# Patient Record
(demographics unavailable — no encounter records)

---

## 2024-11-07 NOTE — REASON FOR VISIT
[Abnormal Uterine Bleeding] : abnormal uterine bleeding [FreeTextEntry2] : Abnormal uterine bleeding

## 2024-11-07 NOTE — HISTORY OF PRESENT ILLNESS
[Patient reported PAP Smear was normal] : Patient reported PAP Smear was normal [perimenopausal] : perimenopausal [N] : Patient is not sexually active [Y] : Positive pregnancy history [Prior Menses:  ___ (Date)] : date of prior menstruation was [unfilled] [Experiencing Menopausal Sxs] : experiencing menopausal symptoms [Menopause Age: ____] : age at menopause was [unfilled] [No] : Patient does not have concerns regarding sex [Previously active] : previously active [TextBox_4] : 57-year old  3 para 2 LMP 2024,  2024 presents c/o abnormal vaginal bleeding x 2 days now.  The patient states that prior to January her cycles came monthly.   [PapSmeardate] : 2019 [TextBox_102] : Irregular since January. [LMPDate] : 4/1/2024 [PGHxTotal] : 3 [[facility]] : 2 [[facility]] : 2 [PGxEctopic] : 1 [FreeTextEntry1] : 4/1/2024

## 2024-11-07 NOTE — PROCEDURE
[Fibroid Uterus] : fibroid uterus [Abnormal Uterine Bleeding] : abnormal uterine bleeding [Transvaginal Ultrasound] : transvaginal ultrasound [Anteverted] : anteverted [L: ___ cm] : L: [unfilled] cm [W: ___cm] : W: [unfilled] cm [H: ___ cm] : H: [unfilled] cm [de-identified] : transabdominal ultrasound [FreeTextEntry7] : not visualized [FreeTextEntry8] : 3.98 x 4.07 x 3.52 cm [FreeTextEntry4] :  As per Dr. Solomon, this ultrasound was performed. The uterus is heterogeneous with multiple fibroids and anteverted. The endometrium was not visualized due to obstruction from fibroids. The cervix measures 4.34 cm. The right ovary was not visualized. The left ovary has a simple cyst measuring: 3.58 x 3.44 x 3.21 cm Multiple fibroids were visualized, see below for locations and sizes: Midline Intramural vs Submucosal: 10.71 x 10.47 x 9.31 cm  Posterior Subserosal: 4.86 x 5.31 x 4.88 cm Anterior Subserosal (Partially Calcified): 3.20 x 3.40 x 1.74 cm Left Lateral Intramural: 5.68 x 5.49 x 5.36 cm

## 2024-11-07 NOTE — PLAN
[FreeTextEntry1] : Wellness exam.  Physical examination is notable for an enlarged leiomyomatous uterus.  Will obtain pelvic ultrasound and abdominal pelvic MRI.  Recommendations: Mammography, screening colonoscopy, dental, and dermatological examination.   Discussed proper nutrition and physical exercise. Reviewed age-appropriate vaccinations.

## 2024-11-07 NOTE — PROCEDURE
[Fibroid Uterus] : fibroid uterus [Abnormal Uterine Bleeding] : abnormal uterine bleeding [Transvaginal Ultrasound] : transvaginal ultrasound [Anteverted] : anteverted [L: ___ cm] : L: [unfilled] cm [W: ___cm] : W: [unfilled] cm [H: ___ cm] : H: [unfilled] cm [de-identified] : transabdominal ultrasound [FreeTextEntry7] : not visualized [FreeTextEntry8] : 3.98 x 4.07 x 3.52 cm [FreeTextEntry4] :  As per Dr. Solomon, this ultrasound was performed. The uterus is heterogeneous with multiple fibroids and anteverted. The endometrium was not visualized due to obstruction from fibroids. The cervix measures 4.34 cm. The right ovary was not visualized. The left ovary has a simple cyst measuring: 3.58 x 3.44 x 3.21 cm Multiple fibroids were visualized, see below for locations and sizes: Midline Intramural vs Submucosal: 10.71 x 10.47 x 9.31 cm  Posterior Subserosal: 4.86 x 5.31 x 4.88 cm Anterior Subserosal (Partially Calcified): 3.20 x 3.40 x 1.74 cm Left Lateral Intramural: 5.68 x 5.49 x 5.36 cm

## 2024-11-07 NOTE — PHYSICAL EXAM
[Chaperone Present] : A chaperone was present in the examining room during all aspects of the physical examination [Oriented x3] : oriented x3 [Examination Of The Breasts] : a normal appearance [No Masses] : no breast masses were palpable [Labia Majora] : normal [Labia Minora] : normal [Uterine Adnexae] : normal [Enlarged ___ wks] : enlarged [unfilled] ~Uweeks [FreeTextEntry2] :  Appropriately responsive, alert, and no acute distress. [FreeTextEntry3] :  Thyroid is non-enlarged, nontender. No palpable nodules or goiter. No lymphadenopathy. [FreeTextEntry7] :  Soft. Nondistended. Nontender. No rebound or guarding. There are no palpable masses. [Vulvar Atrophy] : vulvar atrophy [Normal] : normal [FreeTextEntry1] :  External genitalia are within normal limits with no lesions visualized. [FreeTextEntry4] : Small amount of blood in the vault.  No vaginal discharge or lesions noted within the vaginal vault. [FreeTextEntry5] :  A speculum was inserted without any difficulty. The cervix is posterior. The cervix was normal in appearance. No cervical motion tenderness. Pap smear obtained.  [FreeTextEntry6] : Bimanual examination was notable and enlarged, approximately 24 weeks size fibroid uterus.  The adnexa were not well palpated. [FreeTextEntry9] :  No lesions. No palpable masses.

## 2025-01-31 NOTE — PLAN
[FreeTextEntry1] : 58 -year old  3 para 2 LMP 2024, presents for a postoperative evaluation. She is status post D & C and hysteroscopy on 2025. Indication was for fibroid uterus and abnormal bleeding.

## 2025-01-31 NOTE — REVIEW OF SYSTEMS
[FreeTextEntry8] : abnormal uterine bleeding
never

## 2025-01-31 NOTE — HISTORY OF PRESENT ILLNESS
[Patient reported PAP Smear was normal] : Patient reported PAP Smear was normal [LMP unknown] : LMP unknown [perimenopausal] : perimenopausal [Y] : Positive pregnancy history [unknown] : Patient is unsure of the date of her LMP [Currently In Menopause] : currently in menopause [Menopause Age: ____] : age at menopause was [unfilled] [Currently Active] : currently active [Men] : men [TextBox_4] : 58 -year old  3 para 2 LMP 2024, presents for a postoperative evaluation. She is status post D & C and hysteroscopy on 2025. Indication was for fibroid uterus and abnormal bleeding.  [PapSmeardate] : 11/07/2024 [PGHxTotal] : 3 [[facility]] : 2 [[facility]] : 2 [PGxEctopic] : 1

## 2025-01-31 NOTE — HISTORY OF PRESENT ILLNESS
[Patient reported PAP Smear was normal] : Patient reported PAP Smear was normal [LMP unknown] : LMP unknown [perimenopausal] : perimenopausal [Y] : Positive pregnancy history [unknown] : Patient is unsure of the date of her LMP [Currently In Menopause] : currently in menopause [Menopause Age: ____] : age at menopause was [unfilled] [Currently Active] : currently active [Men] : men [TextBox_4] : 58 -year old  3 para 2 LMP 2024, presents for a postoperative evaluation. She is status post D & C and hysteroscopy on 2025. Indication was for fibroid uterus and abnormal bleeding.  [PapSmeardate] : 11/07/2024 [PGHxTotal] : 3 [[facility] 2 [[facility]] : 2 [PGxEctopic] : 1

## 2025-01-31 NOTE — HISTORY OF PRESENT ILLNESS
[Patient reported PAP Smear was normal] : Patient reported PAP Smear was normal [LMP unknown] : LMP unknown [perimenopausal] : perimenopausal [Y] : Positive pregnancy history [unknown] : Patient is unsure of the date of her LMP [Currently In Menopause] : currently in menopause [Menopause Age: ____] : age at menopause was [unfilled] [Currently Active] : currently active [Men] : men [TextBox_4] : 58 -year old  3 para 2 LMP 2024, presents for a postoperative evaluation. She is status post D & C and hysteroscopy on 2025. Indication was for fibroid uterus and abnormal bleeding.  [PapSmeardate] : 11/07/2024 [PGHxTotal] : 3 [[facility]] : 2 [[facility]] : 2 [PGxEctopic] : 1

## 2025-01-31 NOTE — PHYSICAL EXAM
[Uterine Adnexae] : normal [Soft] : soft [Non-tender] : non-tender [Non-distended] : non-distended [Vulvar Atrophy] : vulvar atrophy [Labia Majora] : normal [Labia Minora] : normal [Normal] : normal [Appropriately responsive] : appropriately responsive [Alert] : alert [No Acute Distress] : no acute distress [Atrophy] : atrophy [FreeTextEntry2] :  Appropriately responsive, alert, and no acute distress. [FreeTextEntry7] :  Soft. Nondistended. Nontender. No rebound or guarding. There are no palpable masses. Back: No CVA tenderness. [FreeTextEntry1] :  External genitalia are within normal limits with no lesions visualized. [FreeTextEntry4] :  No vaginal discharge, blood or any lesions noted within the vaginal vault. [FreeTextEntry5] :  A speculum was inserted without any difficulty. The cervix was normal in appearance. No cervical motion tenderness. [FreeTextEntry6] : Bimanual examination was notable for a normal, nontender uterus. There were no palpable adnexal masses or adnexal tenderness.

## 2025-02-10 NOTE — HISTORY OF PRESENT ILLNESS
[LMP unknown] : LMP unknown [postmenopausal] : postmenopausal [Y] : Positive pregnancy history [unknown] : Patient is unsure of the date of her LMP [Menopause Age: ____] : age at menopause was [unfilled] [TextBox_4] : 58 -year old  3 para 1 postmenopausal presents for a surgical consultation. She is here today to discuss hysterectomy procedure. [PapSmeardate] : 11/07/2024 [PGHxTotal] : 3 [[facility]] : 2 [[facility]] : 2 [PGxEctopic] : 1

## 2025-02-10 NOTE — HISTORY OF PRESENT ILLNESS
[LMP unknown] : LMP unknown [postmenopausal] : postmenopausal [Y] : Positive pregnancy history [unknown] : Patient is unsure of the date of her LMP [Menopause Age: ____] : age at menopause was [unfilled] [TextBox_4] : 58 -year old  3 para 1 postmenopausal presents for a surgical consultation. She is here today to discuss hysterectomy procedure. [PapSmeardate] : 11/07/2024 [PGHxTotal] : 3 [[facility]] : 2 [[facility]] : 2 [PGxEctopic] : 1

## 2025-02-10 NOTE — PLAN
[FreeTextEntry1] : 58 -year old  3 para 1 postmenopausal presents for a surgical consultation. She is here today to discuss hysterectomy procedure.  She is status post fractional dilatation and curettage and diagnostic hysteroscopy on 2025. Indications for the procedure were an enlarged leiomyomatous uterus and abnormal uterine bleeding

## 2025-02-20 NOTE — HISTORY OF PRESENT ILLNESS
[FreeTextEntry1] : Ms. Ruiz is a 59 yo  who presents today as a referral from Dr. Solomon for surgical consultation.  Pt with h/o AUB, shakira-menopausal, experiencing menopausal symptoms ~57 (LMP ~24). Pt underwent suboptimal D&C hysteroscopy (25),  procedure limited by large uterine cavity size and multiple fibroids. Final pathology demonstrated fragments of endometrial polyp, as well as disordered proliferative endometrium w/ tubal metaplasia; negative for hyperplasia.  Given large size of uterus and bulk-symptoms, pt was recommended to undergo definitive surgical management with hysterectomy. Gyn Oncology consulted for surgical assistance given size of uterus.  Today, pt reports feeling relatively well. Reports no further episodes of bleeding since November. Tolerating PO w/o issue. Endorses regular bowel movements.  Of note, extremely elevated BP today. Denies HA/vis changes/CP/SOB. Pt reports no diagnosis of HTN. Strongly encouraged pt to go to hospital given hypertensive emergency and stroke risk.  Pelvic MRI (24) Uterus: Enlarged and anteverted, measuring 25.7 x 13.7 x 23cm; extends 14cm above the umbilicus to L2; Dominant right-sided intramural heterogeneous fibroid showing heterogenous appearance and heterogeneous enhancement and measures 12 x 13cm; Sarcomatous changes cannot be excluded. Near the fundus is an 8 x 6cm enhancing fibroid In the lower uterine segment posteriorly is a 6 x7 cm fibroid Along the left side are two fibroids measuring around 4cm in size. Several other smaller fibroids are seen Endometrium: Distorted due to multiple fibroids; There is a 6mm endometrium and an 8mm junctional zone Adnexa:  Right ovary: not visualized Left ovary: enlarged measuring 5.3 x 3.2 x 4cm w/ dominant 4cm cyst and an additional 25mm dominant follicle  HCM   Pap: 24, NILM, HPV neg Mammo: , wnl per pt C-scope: Never   Ob Hx:  Gyn Hx: Last menses 2024, has not yet gone a whole year w/o menses; +fibroids Med Hx: Obesity (BMI 36) Surg Hx: D&C Meds: None All: Meloxicam Fam Hx: None Soc Hx: Denies tobacco/EtOH/illicit drug use

## 2025-02-20 NOTE — CHIEF COMPLAINT
[FreeTextEntry1] : [place] [employer] Location  [facility] [place] [employer] [phone] [address] [place] [address]  CC: Surgical consultation

## 2025-02-20 NOTE — ASSESSMENT
[FreeTextEntry1] : Ms. Ruiz is a 59 yo  who presents today as a referral from Dr. Solomon for surgical consultation. Massively enlarged fibroid uterus. Reviewed surgical approach, r/b/a. Reviewed high risk of bleeding given enlarged fibroid uterus. Consent signed for ex-lap/GIRMA/BSO/cystoscopy/all other indicated procedures. Overall low risk of malignancy however given age and PMB, will send for IOFS. Staging to be performed if pt has cancer. Of note, extremely elevated BP today. Denies HA/vis changes/CP/SOB. Pt reports no diagnosis of HTN. Strongly encouraged pt to go to hospital given hypertensive emergency and stroke risk. Pt will need PCP clearance in addition to PST prior to surgery.

## 2025-02-20 NOTE — CHIEF COMPLAINT
[FreeTextEntry1] : [place] Location  [facility] [place] [phone] [address] [place] [address]  CC: Surgical consultation

## 2025-02-20 NOTE — PHYSICAL EXAM
[Chaperone Present] : A chaperone was present in the examining room during all aspects of the physical examination [78648] : A chaperone was present during the pelvic exam. [Normal] : No focal neurologic defects observed [Fully active, able to carry on all pre-disease performance without restriction] : Status 0 - Fully active, able to carry on all pre-disease performance without restriction [FreeTextEntry2] : Deborah Carlisle MA [FreeTextEntry1] : older than stated age [de-identified] : uterus palpable to approx 4 finger breadths above umbilicus, not quite at xyphoid; no TTP [de-identified] : SSE: NEFG, normal vaginal mucosa, grossly normal appearing cervix w/o lesions/nodularity BME: Massively enlarged uterus, abnormal contours measuring approx 4 finger breadths beyond umbilicus, unable to palpate adnexa

## 2025-02-20 NOTE — ASSESSMENT
[FreeTextEntry1] : Ms. Ruiz is a 57 yo  who presents today as a referral from Dr. Solomon for surgical consultation. Massively enlarged fibroid uterus. Reviewed surgical approach, r/b/a. Reviewed high risk of bleeding given enlarged fibroid uterus. Consent signed for ex-lap/GIRMA/BSO/cystoscopy/all other indicated procedures. Overall low risk of malignancy however given age and PMB, will send for IOFS. Staging to be performed if pt has cancer. Of note, extremely elevated BP today. Denies HA/vis changes/CP/SOB. Pt reports no diagnosis of HTN. Strongly encouraged pt to go to hospital given hypertensive emergency and stroke risk. Pt will need PCP clearance in addition to PST prior to surgery.

## 2025-02-20 NOTE — DISCUSSION/SUMMARY
[Reviewed Clinical Lab Test(s)] : Results of clinical tests were reviewed. [Discuss Tests w/Referring Providers] : Results of labs/radiology studies and the treatment recommendations were discussed with performing/referring physician. [Discuss Alternatives/Risks/Benefits w/Patient] : All alternatives, risks, and benefits were discussed with the patient/family and all questions were answered.  Patient expressed good understanding and appreciates the importance of follow up as recommended. [Pre Op] : The differential diagnosis was discussed in detail. The indications, risks, benefits and alternatives were discussed. [unfilled] expressed an understanding of the treatment rationale and her questions were answered to her apparent satisfaction.

## 2025-02-20 NOTE — PHYSICAL EXAM
[Chaperone Present] : A chaperone was present in the examining room during all aspects of the physical examination [[id number]] : A chaperone was present during the pelvic exam. [Normal] : No focal neurologic defects observed [Fully active, able to carry on all pre-disease performance without restriction] : Status 0 - Fully active, able to carry on all pre-disease performance without restriction [FreeTextEntry2] : Deborah Carlisle MA [FreeTextEntry1] : older than stated age [de-identified] : uterus palpable to approx 4 finger breadths above umbilicus, not quite at xyphoid; no TTP [de-identified] : SSE: NEFG, normal vaginal mucosa, grossly normal appearing cervix w/o lesions/nodularity BME: Massively enlarged uterus, abnormal contours measuring approx 4 finger breadths beyond umbilicus, unable to palpate adnexa

## 2025-02-20 NOTE — PLAN
[TextEntry] : - Extensive discussion regarding definitive surgical management for symptomatic fibroid uterus - Consent signed for ex-lap/GIRMA/BSO/cystoscopy/all other indicated procedures - Pt will need PCP, PST testing prior to surgery - OR  will coordinate w/ Dr. Solomon's  and call w/ date/time surgery - Of note, extremely elevated BP today. Denies HA/vis changes/CP/SOB. Pt reports no diagnosis of HTN. Strongly encouraged pt to go to hospital given hypertensive emergency and stroke risk. Pt was considering ED evaluation.

## 2025-03-16 NOTE — REVIEW OF SYSTEMS
[Patient Intake Form Reviewed] : Patient intake form was reviewed [FreeTextEntry7] :  Constipation.  Incisional tenderness, burning

## 2025-03-16 NOTE — HISTORY OF PRESENT ILLNESS
[Patient reported PAP Smear was normal] : Patient reported PAP Smear was normal [postmenopausal] : postmenopausal [Y] : Positive pregnancy history [TextBox_4] : 58-year-old  3 para 2 postmenopausal presents for post-op visit. Patient is status post total abdominal hysterectomy and bilateral salpingo-oophorectomy on 2025, for an enlarged fibroid uterus and abnormal uterine bleeding. The patient states she is experiencing pain when touching the incision and during any type of movement. She reports the pain feels like a burning sensation along the incision line. She also states she has not had a bowel movement since procedure but has passed a lot of flatus. [PapSmeardate] : 11/07/2024 [TextBox_31] : Negative for malignancy. Trichomonas vaginalis present. [LMPDate] : 11/2024 [PGHxTotal] : 3 [[facility]] : 2 [[facility]] : 2 [PGxEctopic] : 1

## 2025-03-16 NOTE — PHYSICAL EXAM
[Chaperone Present] : A chaperone was present in the examining room during all aspects of the physical examination [Oriented x3] : oriented x3 [FreeTextEntry2] : Appropriately responsive, alert, and no acute distress. [FreeTextEntry7] : Abdomen is soft, nondistended. Incision tenderness. No rebound or guarding. Vertical incision covered with Mepilex dressing. No oozing.

## 2025-03-16 NOTE — PLAN
[FreeTextEntry1] : Status post total abdominal hysterectomy and bilateral salpingo-oophorectomy on March 6, 2025, for an enlarged fibroid uterus and abnormal uterine bleeding.  The incision is bandaged but intact. Constipation.  Reviewed proper diet.  Recommend Dulcolax suppository.  Intraoperative findings reviewed with the patient and her daughter. No evidence of cancer. Pathology pending. Reviewed postoperative restrictions and limitations. Follow-up in 1 week for removal of staples.

## 2025-03-16 NOTE — HISTORY OF PRESENT ILLNESS
[Patient reported PAP Smear was normal] : Patient reported PAP Smear was normal [postmenopausal] : postmenopausal [Y] : Positive pregnancy history [TextBox_4] : 58-year-old  3 para 2 postmenopausal presents for post-op visit. Patient is status post total abdominal hysterectomy and bilateral salpingo-oophorectomy on 2025, for an enlarged fibroid uterus and abnormal uterine bleeding. The patient states she is experiencing pain when touching the incision and during any type of movement. She reports the pain feels like a burning sensation along the incision line. She also states she has not had a bowel movement since procedure but has passed a lot of flatus. [PapSmeardate] : 11/07/2024 [TextBox_31] : Negative for malignancy. Trichomonas vaginalis present. [LMPDate] : 11/2024 [PGHxTotal] : 3 [[facility]Full[facility]] : 2 [[facility]] : 2 [PGxEctopic] : 1

## 2025-03-21 NOTE — PHYSICAL EXAM
[Chaperone Present] : A chaperone was present in the examining room during all aspects of the physical examination [Oriented x3] : oriented x3 [FreeTextEntry2] : Appropriately responsive, alert, and no acute distress. [FreeTextEntry7] : Abdomen is sot, nondistended, nontender. Vertical incision is well healing. No erythema, edema, or oozing. Staples are present.  The staples were removed and replaced with Steri-Strips.

## 2025-03-21 NOTE — PLAN
[FreeTextEntry1] : Status post total abdominal hysterectomy and bilateral salpingo-oophorectomy on March 6, 2025, for an enlarged fibroid uterus and abnormal uterine bleeding.  The vertical skin incision healing well.  The staples were removed and replaced with Steri-Strips. Postoperative post-operative restrictions and limitations reviewed.  Pathology reviewed: Benign leiomyomata.  Uterus 18.5 x 18.9 x 19.7 cm.  Weight 3055 g.  Inactive endometrium.  Normal cervix.  Bilateral benign mature cystic teratomas.  Benign fallopian tubes.  The significance of the pathology findings were reviewed.  Again, there is no evidence of cancer.  The cystic teratoma's of the ovaries are benign.   Follow-up office visit with pelvic examination in 1 month.

## 2025-03-21 NOTE — HISTORY OF PRESENT ILLNESS
[Patient reported PAP Smear was normal] : Patient reported PAP Smear was normal [LMP unknown] : LMP unknown [postmenopausal] : postmenopausal [Y] : Positive pregnancy history [TextBox_4] : 58-year-old  3 para 2 postmenopausal presents for post-op visit. Patient is status post total abdominal hysterectomy and bilateral salpingo-oophorectomy on 2025, for an enlarged fibroid uterus and abnormal uterine bleeding. The patient states she is still currently experiencing incision pain with movement. [PapSmeardate] : 11/07/2024 [TextBox_31] : Negative for malignancy. Trichomonas vaginalis present [PGHxTotal] : 3 [[facility]] : 2 [[facility]] : 2 [PGxEctopic] : 1

## 2025-03-21 NOTE — REVIEW OF SYSTEMS
[Patient Intake Form Reviewed] : Patient intake form was reviewed [FreeTextEntry7] : Incisional pain with movement

## 2025-04-18 NOTE — PHYSICAL EXAM
[Oriented x3] : oriented x3 [Labia Majora] : normal [Labia Minora] : normal [Normal] : normal [Absent] : absent [Uterine Adnexae] : absent [FreeTextEntry2] : Appropriately responsive, alert, and no acute distress. [FreeTextEntry7] : Abdomen is soft, nondistended, nontender. No rebound or guarding. No palpable masses. Vertical incision is completely healed. No erythema, edema, or oozing. [FreeTextEntry1] : External genitalia are within normal limits with no lesions visualized. [FreeTextEntry5] : Status post total abdominal hysterectomy and bilateral salpingo-oophorectomy. A speculum was inserted without any difficulty. The cervix was absent in appearance. [FreeTextEntry6] : Status post total abdominal hysterectomy and bilateral salpingo-oophorectomy. Bimanual examination was notable for an absent uterus and adnexa.

## 2025-04-18 NOTE — HISTORY OF PRESENT ILLNESS
[N] : Patient is not sexually active [Y] : Positive pregnancy history [No] : Patient does not have concerns regarding sex [Previously active] : previously active [TextBox_4] : 58-year-old  3 para 3 LMP: 2024, presents for post-operative follow-up. She is status post total abdominal hysterectomy and bilateral salpingo-oophorectomy due to enlarged fibroid uterus and abnormal uterine bleeding on 2025. She states she has been experiencing sleep disturbances and lower mid-abdomen pain that causes her to lay down in bed. She also reports that she can go the whole day without urinating. She denies any fevers or nausea. [PapSmeardate] : 11/07/2024 [TextBox_31] : Trichomonas vaginalis present, negative for malignancy [LMPDate] : 04/01/2024 [PGHxTotal] : 3 [[facility]xFull[facility]] : 2 [[facility]xLiving] : 2 [PGxEctopic] : 1 [FreeTextEntry1] : 04/01/2024

## 2025-04-18 NOTE — PLAN
[FreeTextEntry1] : 58-year-old  3 para 3 LMP: 2024, presents for 1-month follow-up. Normal physical examination.  Status post total abdominal hysterectomy and bilateral salpingo-oophorectomy due to enlarged fibroid uterus and abnormal uterine bleeding done on 2025.   Elevated blood pressure reading in-office today. (163/90 and 46/94) Recommended to follow-up with her PCP.  She is planning to extend her [id number] date until May 1, 2025.

## 2025-04-18 NOTE — REVIEW OF SYSTEMS
[Patient Intake Form Reviewed] : Patient intake form was reviewed [FreeTextEntry7] : Lower mid-abdomen incisional pain and constipation [FreeTextEntry8] : oliguria [de-identified] : Sleep disturbances

## 2025-04-18 NOTE — REVIEW OF SYSTEMS
[Patient Intake Form Reviewed] : Patient intake form was reviewed [FreeTextEntry7] : Lower mid-abdomen incisional pain and constipation [FreeTextEntry8] : oliguria [de-identified] : Sleep disturbances

## 2025-04-18 NOTE — HISTORY OF PRESENT ILLNESS
[N] : Patient is not sexually active [Y] : Positive pregnancy history [No] : Patient does not have concerns regarding sex [Previously active] : previously active [TextBox_4] : 58-year-old  3 para 3 LMP: 2024, presents for post-operative follow-up. She is status post total abdominal hysterectomy and bilateral salpingo-oophorectomy due to enlarged fibroid uterus and abnormal uterine bleeding on 2025. She states she has been experiencing sleep disturbances and lower mid-abdomen pain that causes her to lay down in bed. She also reports that she can go the whole day without urinating. She denies any fevers or nausea. [PapSmeardate] : 11/07/2024 [TextBox_31] : Trichomonas vaginalis present, negative for malignancy [LMPDate] : 04/01/2024 [PGHxTotal] : 3 [[facility] [[facility] : 2 [PGxEctopic] : 1 [FreeTextEntry1] : 04/01/2024

## 2025-04-18 NOTE — PLAN
[FreeTextEntry1] : 58-year-old  3 para 3 LMP: 2024, presents for 1-month follow-up. Normal physical examination.  Status post total abdominal hysterectomy and bilateral salpingo-oophorectomy due to enlarged fibroid uterus and abnormal uterine bleeding done on 2025.   Elevated blood pressure reading in-office today. (163/90 and 46/94) Recommended to follow-up with her PCP.  She is planning to extend her fmyyua-pu-vpar date until May 1, 2025.